# Patient Record
Sex: FEMALE | Race: BLACK OR AFRICAN AMERICAN | URBAN - METROPOLITAN AREA
[De-identification: names, ages, dates, MRNs, and addresses within clinical notes are randomized per-mention and may not be internally consistent; named-entity substitution may affect disease eponyms.]

---

## 2022-12-16 ENCOUNTER — LAB OUTSIDE AN ENCOUNTER (OUTPATIENT)
Dept: URBAN - METROPOLITAN AREA CLINIC 113 | Facility: CLINIC | Age: 32
End: 2022-12-16

## 2022-12-16 ENCOUNTER — WEB ENCOUNTER (OUTPATIENT)
Dept: URBAN - METROPOLITAN AREA CLINIC 113 | Facility: CLINIC | Age: 32
End: 2022-12-16

## 2022-12-16 ENCOUNTER — OFFICE VISIT (OUTPATIENT)
Dept: URBAN - METROPOLITAN AREA CLINIC 113 | Facility: CLINIC | Age: 32
End: 2022-12-16
Payer: COMMERCIAL

## 2022-12-16 VITALS
HEIGHT: 65 IN | RESPIRATION RATE: 20 BRPM | DIASTOLIC BLOOD PRESSURE: 83 MMHG | SYSTOLIC BLOOD PRESSURE: 150 MMHG | BODY MASS INDEX: 29.02 KG/M2 | HEART RATE: 100 BPM | TEMPERATURE: 97.7 F | WEIGHT: 174.2 LBS

## 2022-12-16 DIAGNOSIS — F41.8 ANXIETY ABOUT HEALTH: ICD-10-CM

## 2022-12-16 DIAGNOSIS — R10.31 RLQ ABDOMINAL PAIN: ICD-10-CM

## 2022-12-16 DIAGNOSIS — R93.89 ABNORMAL COMPUTED TOMOGRAPHY ANGIOGRAPHY (CTA): ICD-10-CM

## 2022-12-16 DIAGNOSIS — K21.9 GASTROESOPHAGEAL REFLUX DISEASE, UNSPECIFIED WHETHER ESOPHAGITIS PRESENT: ICD-10-CM

## 2022-12-16 DIAGNOSIS — R63.0 LOSS OF APPETITE: ICD-10-CM

## 2022-12-16 PROCEDURE — 99204 OFFICE O/P NEW MOD 45 MIN: CPT

## 2022-12-16 RX ORDER — PROPRANOLOL HYDROCHLORIDE 10 MG/1
1 TABLET TABLET ORAL ONCE A DAY
Status: ACTIVE | COMMUNITY

## 2022-12-16 RX ORDER — NIFEDIPINE 60 MG/1
1 TABLET ON AN EMPTY STOMACH TABLET, EXTENDED RELEASE ORAL ONCE A DAY
Status: ACTIVE | COMMUNITY

## 2022-12-16 RX ORDER — ERGOCALCIFEROL CAPSULES, 1.25 MG/1
1 CAPSULE CAPSULE ORAL
Status: ACTIVE | COMMUNITY

## 2022-12-16 RX ORDER — FAMOTIDINE 20 MG/1
1 TABLET AT BEDTIME AS NEEDED TABLET, FILM COATED ORAL ONCE A DAY
Status: ACTIVE | COMMUNITY

## 2022-12-16 RX ORDER — SALICYLIC ACID 3 G/100G
1 TABLET SWALLOW WHOLE WITH WATER; DO NOT TAKE WITH FRUIT JUICES LOTION TOPICAL ONCE A DAY
Status: ACTIVE | COMMUNITY

## 2022-12-16 RX ORDER — OMEPRAZOLE 40 MG/1
1 CAPSULE 30 MINUTES BEFORE MORNING MEAL CAPSULE, DELAYED RELEASE ORAL ONCE A DAY
Qty: 90 | Refills: 1 | OUTPATIENT

## 2022-12-16 RX ORDER — DICYCLOMINE HYDROCHLORIDE 10 MG/1
1 TABLET CAPSULE ORAL
Qty: 60 | Refills: 1 | OUTPATIENT

## 2022-12-16 NOTE — HPI-TODAY'S VISIT:
Ms. Gamble is a 32-year-old female with a history of anxiety presenting for follow-up after hospitalization for abdominal pain.  11/30/2022 seen at [facility] ER for abdominal pain.  She complained of abdominal pain, nausea, vomiting, diarrhea for a few days.  CT angiogram revealed no evidence for PE, circumferential thickening and submucosal edema of the pylorus.  Findings may be on the basis of large gastric ulcer, malignancy, or pyloric hypertrophy.  12/7/2022 seen at [facility] ER for abdominal pain.  She complained of right lower quadrant pain, diarrhea, vomiting.  CT abdomen pelvis with contrast revealed free fluid adjacent to the appendix which is normal in caliber although minimally increased from prior exam.  Tiny appendicolith within the appendix.  Findings suggestive of early appendicitis.  Small amount of free fluid in the pelvis.  She was admitted and treated.  Today she presents  with her mother. Since last hospital visit, she was released to surgery for appendectomy from ER, but surgery evaluated her and decided no appendectomy was needed. She has been seen  for high bp and palpitations. She reports all her symptoms began after Thanksgiving. She had diarrhea with mucous that lasted 1 week. She has not had an appetite since. She is very concerned, tearful, and anxious about lack of appetite, lack of energy, and not eating. She reports intermittent RLQ pain. She has not noticed if it is related to eating or bowel movements. She also reports upper abdominal pain that seems no better with eating. She reports daily formed stool without blood or straining. She reports feeling gassy and has acid reflux, for which she uses famotidine 20 mg BID. She uses goody's powder daily for headaches and migraines.

## 2022-12-19 ENCOUNTER — TELEPHONE ENCOUNTER (OUTPATIENT)
Dept: URBAN - METROPOLITAN AREA CLINIC 113 | Facility: CLINIC | Age: 32
End: 2022-12-19

## 2022-12-19 ENCOUNTER — CLAIMS CREATED FROM THE CLAIM WINDOW (OUTPATIENT)
Dept: URBAN - METROPOLITAN AREA CLINIC 4 | Facility: CLINIC | Age: 32
End: 2022-12-19
Payer: COMMERCIAL

## 2022-12-19 ENCOUNTER — CLAIMS CREATED FROM THE CLAIM WINDOW (OUTPATIENT)
Dept: URBAN - METROPOLITAN AREA SURGERY CENTER 25 | Facility: SURGERY CENTER | Age: 32
End: 2022-12-19
Payer: COMMERCIAL

## 2022-12-19 ENCOUNTER — OFFICE VISIT (OUTPATIENT)
Dept: URBAN - METROPOLITAN AREA SURGERY CENTER 25 | Facility: SURGERY CENTER | Age: 32
End: 2022-12-19

## 2022-12-19 DIAGNOSIS — R93.3 ABN FINDINGS-GI TRACT: ICD-10-CM

## 2022-12-19 DIAGNOSIS — K31.89 REACTIVE GASTROPATHY: ICD-10-CM

## 2022-12-19 DIAGNOSIS — K31.89 OTHER DISEASES OF STOMACH AND DUODENUM: ICD-10-CM

## 2022-12-19 DIAGNOSIS — R63.4 ABNORMAL WEIGHT LOSS: ICD-10-CM

## 2022-12-19 PROCEDURE — 43239 EGD BIOPSY SINGLE/MULTIPLE: CPT | Performed by: INTERNAL MEDICINE

## 2022-12-19 PROCEDURE — 88305 TISSUE EXAM BY PATHOLOGIST: CPT | Performed by: PATHOLOGY

## 2022-12-19 PROCEDURE — G8907 PT DOC NO EVENTS ON DISCHARG: HCPCS | Performed by: INTERNAL MEDICINE

## 2022-12-19 RX ORDER — DICYCLOMINE HYDROCHLORIDE 10 MG/1
1 TABLET CAPSULE ORAL
Qty: 60 | Refills: 1 | Status: ACTIVE | COMMUNITY

## 2022-12-19 RX ORDER — ERGOCALCIFEROL CAPSULES, 1.25 MG/1
1 CAPSULE CAPSULE ORAL
Status: ACTIVE | COMMUNITY

## 2022-12-19 RX ORDER — OMEPRAZOLE 40 MG/1
1 CAPSULE 30 MINUTES BEFORE MORNING MEAL CAPSULE, DELAYED RELEASE ORAL ONCE A DAY
Qty: 90 | Refills: 1 | Status: ACTIVE | COMMUNITY

## 2022-12-19 RX ORDER — PROPRANOLOL HYDROCHLORIDE 10 MG/1
1 TABLET TABLET ORAL ONCE A DAY
Status: ACTIVE | COMMUNITY

## 2022-12-19 RX ORDER — NIFEDIPINE 60 MG/1
1 TABLET ON AN EMPTY STOMACH TABLET, EXTENDED RELEASE ORAL ONCE A DAY
Status: ACTIVE | COMMUNITY

## 2022-12-19 RX ORDER — SALICYLIC ACID 3 G/100G
1 TABLET SWALLOW WHOLE WITH WATER; DO NOT TAKE WITH FRUIT JUICES LOTION TOPICAL ONCE A DAY
Status: ACTIVE | COMMUNITY

## 2022-12-19 RX ORDER — FAMOTIDINE 20 MG/1
1 TABLET AT BEDTIME AS NEEDED TABLET, FILM COATED ORAL ONCE A DAY
Status: ACTIVE | COMMUNITY

## 2022-12-20 LAB
A/G RATIO: 1.6
ABSOLUTE BASOPHILS: 29
ABSOLUTE EOSINOPHILS: 120
ABSOLUTE LYMPHOCYTES: 2383
ABSOLUTE MONOCYTES: 302
ABSOLUTE NEUTROPHILS: 2867
ALBUMIN: 4.1
ALKALINE PHOSPHATASE: 54
ALT (SGPT): 11
AST (SGOT): 10
BASOPHILS: 0.5
BILIRUBIN, TOTAL: 0.4
BUN/CREATININE RATIO: 8
BUN: 6
C-REACTIVE PROTEIN, QUANT: 0.2
CALCIUM: 9.3
CARBON DIOXIDE, TOTAL: 27
CHLORIDE: 105
CREATININE: 0.77
EGFR: 104
EOSINOPHILS: 2.1
GLOBULIN, TOTAL: 2.5
GLUCOSE: 138
HEMATOCRIT: 35.8
HEMOGLOBIN: 12.4
LYMPHOCYTES: 41.8
MCH: 33
MCHC: 34.6
MCV: 95.2
MONOCYTES: 5.3
MPV: 10.5
NEUTROPHILS: 50.3
PLATELET COUNT: 265
POTASSIUM: 3.7
PROTEIN, TOTAL: 6.6
RDW: 11.6
RED BLOOD CELL COUNT: 3.76
SODIUM: 137
TSH: 0.51
WHITE BLOOD CELL COUNT: 5.7

## 2022-12-23 ENCOUNTER — TELEPHONE ENCOUNTER (OUTPATIENT)
Dept: URBAN - METROPOLITAN AREA CLINIC 113 | Facility: CLINIC | Age: 32
End: 2022-12-23

## 2022-12-27 LAB
CALPROTECTIN, FECAL: 135
FECAL FAT, QUALITATIVE: (no result)
OVA AND PARASITES, CONC AND PERM SMEAR: (no result)

## 2023-01-09 ENCOUNTER — OFFICE VISIT (OUTPATIENT)
Dept: URBAN - METROPOLITAN AREA CLINIC 113 | Facility: CLINIC | Age: 33
End: 2023-01-09

## 2023-01-11 ENCOUNTER — TELEPHONE ENCOUNTER (OUTPATIENT)
Dept: URBAN - METROPOLITAN AREA CLINIC 113 | Facility: CLINIC | Age: 33
End: 2023-01-11

## 2023-01-12 ENCOUNTER — TELEPHONE ENCOUNTER (OUTPATIENT)
Dept: URBAN - METROPOLITAN AREA CLINIC 113 | Facility: CLINIC | Age: 33
End: 2023-01-12

## 2023-01-13 ENCOUNTER — OFFICE VISIT (OUTPATIENT)
Dept: URBAN - METROPOLITAN AREA CLINIC 107 | Facility: CLINIC | Age: 33
End: 2023-01-13

## 2023-01-17 ENCOUNTER — TELEPHONE ENCOUNTER (OUTPATIENT)
Dept: URBAN - METROPOLITAN AREA CLINIC 113 | Facility: CLINIC | Age: 33
End: 2023-01-17

## 2023-01-17 ENCOUNTER — OFFICE VISIT (OUTPATIENT)
Dept: URBAN - METROPOLITAN AREA CLINIC 113 | Facility: CLINIC | Age: 33
End: 2023-01-17
Payer: COMMERCIAL

## 2023-01-17 ENCOUNTER — OFFICE VISIT (OUTPATIENT)
Dept: URBAN - METROPOLITAN AREA CLINIC 113 | Facility: CLINIC | Age: 33
End: 2023-01-17

## 2023-01-17 VITALS
TEMPERATURE: 98.3 F | WEIGHT: 168.8 LBS | DIASTOLIC BLOOD PRESSURE: 87 MMHG | HEART RATE: 105 BPM | BODY MASS INDEX: 28.12 KG/M2 | HEIGHT: 65 IN | RESPIRATION RATE: 20 BRPM | SYSTOLIC BLOOD PRESSURE: 148 MMHG

## 2023-01-17 DIAGNOSIS — R93.89 ABNORMAL COMPUTED TOMOGRAPHY ANGIOGRAPHY (CTA): ICD-10-CM

## 2023-01-17 DIAGNOSIS — G47.00 INSOMNIA, UNSPECIFIED TYPE: ICD-10-CM

## 2023-01-17 DIAGNOSIS — K37 APPENDICITIS, UNSPECIFIED APPENDICITIS TYPE: ICD-10-CM

## 2023-01-17 DIAGNOSIS — R63.4 WEIGHT LOSS: ICD-10-CM

## 2023-01-17 DIAGNOSIS — R19.5 ELEVATED FECAL CALPROTECTIN: ICD-10-CM

## 2023-01-17 DIAGNOSIS — R63.0 LOSS OF APPETITE: ICD-10-CM

## 2023-01-17 DIAGNOSIS — K86.89 PANCREATIC INSUFFICIENCY: ICD-10-CM

## 2023-01-17 DIAGNOSIS — R10.31 RLQ ABDOMINAL PAIN: ICD-10-CM

## 2023-01-17 DIAGNOSIS — F41.8 ANXIETY ABOUT HEALTH: ICD-10-CM

## 2023-01-17 DIAGNOSIS — R19.7 DIARRHEA OF PRESUMED INFECTIOUS ORIGIN: ICD-10-CM

## 2023-01-17 DIAGNOSIS — K25.3 ACUTE GASTRIC ULCER WITHOUT HEMORRHAGE OR PERFORATION: ICD-10-CM

## 2023-01-17 PROBLEM — 193462001: Status: ACTIVE | Noted: 2023-01-17

## 2023-01-17 PROCEDURE — 99214 OFFICE O/P EST MOD 30 MIN: CPT

## 2023-01-17 RX ORDER — PROPRANOLOL HYDROCHLORIDE 10 MG/1
1 TABLET TABLET ORAL ONCE A DAY
Status: ACTIVE | COMMUNITY

## 2023-01-17 RX ORDER — NIFEDIPINE 60 MG/1
1 TABLET ON AN EMPTY STOMACH TABLET, EXTENDED RELEASE ORAL ONCE A DAY
Status: ACTIVE | COMMUNITY

## 2023-01-17 RX ORDER — PANCRELIPASE 36000; 180000; 114000 [USP'U]/1; [USP'U]/1; [USP'U]/1
AS DIRECTED CAPSULE, DELAYED RELEASE PELLETS ORAL
Qty: 250 | Refills: 6 | OUTPATIENT
Start: 2023-01-17 | End: 2023-08-15

## 2023-01-17 RX ORDER — DICYCLOMINE HYDROCHLORIDE 10 MG/1
1 TABLET CAPSULE ORAL
Qty: 60 | Refills: 1 | Status: ACTIVE | COMMUNITY

## 2023-01-17 RX ORDER — OMEPRAZOLE 40 MG/1
1 CAPSULE 30 MINUTES BEFORE MORNING MEAL CAPSULE, DELAYED RELEASE ORAL ONCE A DAY
Qty: 90 | Refills: 1 | Status: ACTIVE | COMMUNITY

## 2023-01-17 RX ORDER — PANCRELIPASE 36000; 180000; 114000 [USP'U]/1; [USP'U]/1; [USP'U]/1
AS DIRECTED CAPSULE, DELAYED RELEASE PELLETS ORAL
Qty: 250 | Refills: 6 | Status: ACTIVE | COMMUNITY
Start: 2023-01-17 | End: 2023-08-15

## 2023-01-17 RX ORDER — PANTOPRAZOLE SODIUM 40 MG/1
1 TABLET TABLET, DELAYED RELEASE ORAL ONCE A DAY
Qty: 60 TABLET | Refills: 0 | OUTPATIENT
Start: 2023-01-17

## 2023-01-17 RX ORDER — ERGOCALCIFEROL CAPSULES, 1.25 MG/1
1 CAPSULE CAPSULE ORAL
Status: ACTIVE | COMMUNITY

## 2023-01-17 RX ORDER — PANTOPRAZOLE SODIUM 40 MG/1
1 TABLET TABLET, DELAYED RELEASE ORAL ONCE A DAY
Qty: 30 | Refills: 2 | OUTPATIENT
Start: 2023-01-18

## 2023-01-17 RX ORDER — SALICYLIC ACID 3 G/100G
1 TABLET SWALLOW WHOLE WITH WATER; DO NOT TAKE WITH FRUIT JUICES LOTION TOPICAL ONCE A DAY
Status: ACTIVE | COMMUNITY

## 2023-01-17 RX ORDER — SODIUM, POTASSIUM,MAG SULFATES 17.5-3.13G
354 ML SOLUTION, RECONSTITUTED, ORAL ORAL ONCE
Qty: 354 MILLILITER | Refills: 0 | Status: ACTIVE | COMMUNITY
Start: 2023-01-17 | End: 2023-01-18

## 2023-01-17 RX ORDER — SODIUM, POTASSIUM,MAG SULFATES 17.5-3.13G
354 ML SOLUTION, RECONSTITUTED, ORAL ORAL ONCE
Qty: 354 MILLILITER | Refills: 0 | OUTPATIENT
Start: 2023-01-17 | End: 2023-01-18

## 2023-01-17 RX ORDER — FAMOTIDINE 20 MG/1
1 TABLET AT BEDTIME AS NEEDED TABLET, FILM COATED ORAL ONCE A DAY
Status: ACTIVE | COMMUNITY

## 2023-01-17 RX ORDER — PANTOPRAZOLE SODIUM 40 MG/1
1 TABLET TABLET, DELAYED RELEASE ORAL ONCE A DAY
Qty: 60 TABLET | Refills: 0 | Status: ACTIVE | COMMUNITY
Start: 2023-01-17

## 2023-01-17 RX ORDER — FERROUS SULFATE 325(65) MG
1 TABLET TABLET ORAL ONCE A DAY
Status: ACTIVE | COMMUNITY

## 2023-01-17 NOTE — HPI-TODAY'S VISIT:
Ms. Gamble is a 32-year-old female with a history of anxiety presenting for follow-up after hospitalization for abdominal pain.  11/30/2022 seen at [facility] ER for abdominal pain.  She complained of abdominal pain, nausea, vomiting, diarrhea for a few days.  CT angiogram revealed no evidence for PE, circumferential thickening and submucosal edema of the pylorus.  Findings may be on the basis of large gastric ulcer, malignancy, or pyloric hypertrophy.  12/7/2022 seen at [facility] ER for abdominal pain.  She complained of right lower quadrant pain, diarrhea, vomiting.  CT abdomen pelvis with contrast revealed free fluid adjacent to the appendix which is normal in caliber although minimally increased from prior exam.  Tiny appendicolith within the appendix.  Findings suggestive of early appendicitis.  Small amount of free fluid in the pelvis.  She was admitted and treated.  Today she presents  with her mother. Since last hospital visit, she was released to surgery for appendectomy from ER, but surgery evaluated her and decided no appendectomy was needed. She has been seen  for high bp and palpitations. She reports all her symptoms began after Thanksgiving. She had diarrhea with mucous that lasted 1 week. She has not had an appetite since. She is very concerned, tearful, and anxious about lack of appetite, lack of energy, and not eating. She reports intermittent RLQ pain. She has not noticed if it is related to eating or bowel movements. She also reports upper abdominal pain that seems no better with eating. She reports daily formed stool without blood or straining. She reports feeling gassy and has acid reflux, for which she uses famotidine 20 mg BID. She uses goody's powder daily for headaches and migraines.  Interval history: 32-year-old female presents for follow-up after EGD.  She was last seen on 12/16/2022.  She presented as an ER follow-up for abdominal pain she reported multiple GI complaints at that time including loss of appetite, intermittent right lower quadrant pain, heartburn, upper abdominal pain.  She did have a CT angiogram which revealed thickening and submucosal edema of the pylorus.  She was planned for EGD for further evaluation and started on omeprazole 40 mg once daily in addition to famotidine 20 mg at night.  She was provided dicyclomine for intermittent right lower quadrant pain as well.  EGD 12/19/2022:Performed without difficulty.  Normal second portion of duodenum and third portion of duodenum status post biopsy.  Pathology was negative for celiac sprue.  Erythematous duodenopathy noted.  A nonbleeding gastric ulcer without stigmata of bleeding noted in the gastric antrum measuring 7 mm.  This was biopsied and revealed chemical/reactive gastropathy, negative for H. pylori or intestinal metaplasia.  Exam was also notable for small hiatal hernia and normal esophagus.  Patient was noted to be taking multiple Goody's powders at that time.  Patient was recommended a multitude of blood work at that time as well.  Labs 12/20/2022:Abnormal fecal fat, negative stool O&P, elevated fecal calprotectin 135, glucose 138 otherwise unremarkable CMP, unremarkable CBC, normal TSH, normal CRP.  She is no longer taking the Omeprazole. She took this for a few weeks. She stopped this on her own accord "as she does not have reflux." Her epigastric pain resolved. She denies any lower abdominal pain as well. Denies nausea or vomiting. She does have alternating bowel habits with occasional loose stools that float in the commode water. She has lost approxiamtely 20 pounds since Thanksgiving. She attributes this to her decreased appetite and oral intake. The patient admits she is quick to become anxious about her health. She is concerned with malignancy.  She does admit to poor sleep and sweating at night. She admits she is not able to shut off her mind. melatonin worsens her sweating. She has tried and failed sleepy teas as well.

## 2023-01-19 ENCOUNTER — ERX REFILL RESPONSE (OUTPATIENT)
Dept: URBAN - METROPOLITAN AREA CLINIC 113 | Facility: CLINIC | Age: 33
End: 2023-01-19

## 2023-01-19 RX ORDER — POLYETHYLENE GLYCOL 3350, SODIUM CHLORIDE, SODIUM BICARBONATE, POTASSIUM CHLORIDE 420; 11.2; 5.72; 1.48 G/4L; G/4L; G/4L; G/4L
AS DIRECTED POWDER, FOR SOLUTION ORAL ONCE
Qty: 420 GM | Refills: 0 | OUTPATIENT

## 2023-01-23 ENCOUNTER — WEB ENCOUNTER (OUTPATIENT)
Dept: URBAN - METROPOLITAN AREA CLINIC 107 | Facility: CLINIC | Age: 33
End: 2023-01-23

## 2023-01-23 ENCOUNTER — OFFICE VISIT (OUTPATIENT)
Dept: URBAN - METROPOLITAN AREA CLINIC 107 | Facility: CLINIC | Age: 33
End: 2023-01-23
Payer: COMMERCIAL

## 2023-01-23 VITALS
SYSTOLIC BLOOD PRESSURE: 138 MMHG | HEART RATE: 89 BPM | WEIGHT: 173 LBS | TEMPERATURE: 97.7 F | HEIGHT: 65 IN | DIASTOLIC BLOOD PRESSURE: 75 MMHG | BODY MASS INDEX: 28.82 KG/M2

## 2023-01-23 DIAGNOSIS — R10.31 RLQ ABDOMINAL PAIN: ICD-10-CM

## 2023-01-23 DIAGNOSIS — K86.89 PANCREATIC INSUFFICIENCY: ICD-10-CM

## 2023-01-23 DIAGNOSIS — R19.5 ELEVATED FECAL CALPROTECTIN: ICD-10-CM

## 2023-01-23 DIAGNOSIS — R93.89 ABNORMAL COMPUTED TOMOGRAPHY ANGIOGRAPHY (CTA): ICD-10-CM

## 2023-01-23 DIAGNOSIS — R63.0 LOSS OF APPETITE: ICD-10-CM

## 2023-01-23 DIAGNOSIS — R19.7 DIARRHEA OF PRESUMED INFECTIOUS ORIGIN: ICD-10-CM

## 2023-01-23 DIAGNOSIS — K21.9 GASTROESOPHAGEAL REFLUX DISEASE, UNSPECIFIED WHETHER ESOPHAGITIS PRESENT: ICD-10-CM

## 2023-01-23 DIAGNOSIS — F41.8 ANXIETY ABOUT HEALTH: ICD-10-CM

## 2023-01-23 DIAGNOSIS — R63.4 WEIGHT LOSS: ICD-10-CM

## 2023-01-23 DIAGNOSIS — K25.3 ACUTE GASTRIC ULCER WITHOUT HEMORRHAGE OR PERFORATION: ICD-10-CM

## 2023-01-23 PROBLEM — 79890006: Status: ACTIVE | Noted: 2022-12-18

## 2023-01-23 PROBLEM — 247808006: Status: ACTIVE | Noted: 2022-12-16

## 2023-01-23 PROBLEM — 235595009: Status: ACTIVE | Noted: 2022-12-18

## 2023-01-23 PROBLEM — 129679001: Status: ACTIVE | Noted: 2022-12-18

## 2023-01-23 PROCEDURE — 99214 OFFICE O/P EST MOD 30 MIN: CPT | Performed by: INTERNAL MEDICINE

## 2023-01-23 RX ORDER — PANTOPRAZOLE SODIUM 40 MG/1
1 TABLET TABLET, DELAYED RELEASE ORAL ONCE A DAY
Qty: 60 TABLET | Refills: 0 | Status: ACTIVE | COMMUNITY
Start: 2023-01-17

## 2023-01-23 RX ORDER — PANCRELIPASE 36000; 180000; 114000 [USP'U]/1; [USP'U]/1; [USP'U]/1
AS DIRECTED CAPSULE, DELAYED RELEASE PELLETS ORAL
Qty: 250 | Refills: 6 | Status: ACTIVE | COMMUNITY
Start: 2023-01-17 | End: 2023-08-15

## 2023-01-23 RX ORDER — PANTOPRAZOLE SODIUM 40 MG/1
1 TABLET TABLET, DELAYED RELEASE ORAL ONCE A DAY
Qty: 30 | Refills: 2 | Status: ACTIVE | COMMUNITY
Start: 2023-01-18

## 2023-01-23 RX ORDER — SALICYLIC ACID 3 G/100G
1 TABLET SWALLOW WHOLE WITH WATER; DO NOT TAKE WITH FRUIT JUICES LOTION TOPICAL ONCE A DAY
Status: ACTIVE | COMMUNITY

## 2023-01-23 RX ORDER — FAMOTIDINE 20 MG/1
1 TABLET AT BEDTIME AS NEEDED TABLET, FILM COATED ORAL ONCE A DAY
Status: ACTIVE | COMMUNITY

## 2023-01-23 RX ORDER — ERGOCALCIFEROL CAPSULES, 1.25 MG/1
1 CAPSULE CAPSULE ORAL
Status: ACTIVE | COMMUNITY

## 2023-01-23 RX ORDER — OMEPRAZOLE 40 MG/1
1 CAPSULE 30 MINUTES BEFORE MORNING MEAL CAPSULE, DELAYED RELEASE ORAL ONCE A DAY
Qty: 90 | Refills: 1 | Status: ACTIVE | COMMUNITY

## 2023-01-23 RX ORDER — NIFEDIPINE 60 MG/1
1 TABLET ON AN EMPTY STOMACH TABLET, EXTENDED RELEASE ORAL ONCE A DAY
Status: ACTIVE | COMMUNITY

## 2023-01-23 RX ORDER — DICYCLOMINE HYDROCHLORIDE 10 MG/1
1 TABLET CAPSULE ORAL
Qty: 60 | Refills: 1 | Status: ACTIVE | COMMUNITY

## 2023-01-23 RX ORDER — FERROUS SULFATE 325(65) MG
1 TABLET TABLET ORAL ONCE A DAY
Status: ACTIVE | COMMUNITY

## 2023-01-23 NOTE — HPI-TODAY'S VISIT:
Ms. Gamble is a 32-year-old female with a history of anxiety presenting for follow-up after hospitalization for abdominal pain.  11/30/2022 seen at [facility] ER for abdominal pain.  She complained of abdominal pain, nausea, vomiting, diarrhea for a few days.  CT angiogram revealed no evidence for PE, circumferential thickening and submucosal edema of the pylorus.  Findings may be on the basis of large gastric ulcer, malignancy, or pyloric hypertrophy.  12/7/2022 seen at [facility] ER for abdominal pain.  She complained of right lower quadrant pain, diarrhea, vomiting.  CT abdomen pelvis with contrast revealed free fluid adjacent to the appendix which is normal in caliber although minimally increased from prior exam.  Tiny appendicolith within the appendix.  Findings suggestive of early appendicitis.  Small amount of free fluid in the pelvis.  She was admitted and treated.  Today she presents  with her mother. Since last hospital visit, she was released to surgery for appendectomy from ER, but surgery evaluated her and decided no appendectomy was needed. She has been seen  for high bp and palpitations. She reports all her symptoms began after Thanksgiving. She had diarrhea with mucous that lasted 1 week. She has not had an appetite since. She is very concerned, tearful, and anxious about lack of appetite, lack of energy, and not eating. She reports intermittent RLQ pain. She has not noticed if it is related to eating or bowel movements. She also reports upper abdominal pain that seems no better with eating. She reports daily formed stool without blood or straining. She reports feeling gassy and has acid reflux, for which she uses famotidine 20 mg BID. She uses goody's powder daily for headaches and migraines.  Interval history: 32-year-old female presents for follow-up after EGD.  She was last seen on 12/16/2022.  She presented as an ER follow-up for abdominal pain she reported multiple GI complaints at that time including loss of appetite, intermittent right lower quadrant pain, heartburn, upper abdominal pain.  She did have a CT angiogram which revealed thickening and submucosal edema of the pylorus.  She was planned for EGD for further evaluation and started on omeprazole 40 mg once daily in addition to famotidine 20 mg at night.  She was provided dicyclomine for intermittent right lower quadrant pain as well.  EGD 12/19/2022:Performed without difficulty.  Normal second portion of duodenum and third portion of duodenum status post biopsy.  Pathology was negative for celiac sprue.  Erythematous duodenopathy noted.  A nonbleeding gastric ulcer without stigmata of bleeding noted in the gastric antrum measuring 7 mm.  This was biopsied and revealed chemical/reactive gastropathy, negative for H. pylori or intestinal metaplasia.  Exam was also notable for small hiatal hernia and normal esophagus.  Patient was noted to be taking multiple Goody's powders at that time.  Patient was recommended a multitude of blood work at that time as well.  Labs 12/20/2022:Abnormal fecal fat, negative stool O&P, elevated fecal calprotectin 135, glucose 138 otherwise unremarkable CMP, unremarkable CBC, normal TSH, normal CRP.  She is no longer taking the Omeprazole. She took this for a few weeks. She stopped this on her own accord "as she does not have reflux." Her epigastric pain resolved. She denies any lower abdominal pain as well. Denies nausea or vomiting. She does have alternating bowel habits with occasional loose stools that float in the commode water. She has lost approxiamtely 20 pounds since Thanksgiving. She attributes this to her decreased appetite and oral intake. The patient admits she is quick to become anxious about her health. She is concerned with malignancy.  She does admit to poor sleep and sweating at night. She admits she is not able to shut off her mind. melatonin worsens her sweating. She has tried and failed sleepy teas as well. Interval history, 1/23/2023.  Patient states she only has diarrhea now when her family calls early in the morning.  If they do not bother her early in the morning she does not have diarrhea.  She is no longer on the Creon.  She is worried that diarrhea may return.  She has numerous questions in regards to nonsteroidal anti-inflammatory agents.  I discussed with her that Tylenol is okay for the stomach.  She does want to take omeprazole 20 mg for mild heartburn.  She is scheduled for colonoscopy later this week.

## 2023-01-27 ENCOUNTER — OFFICE VISIT (OUTPATIENT)
Dept: URBAN - METROPOLITAN AREA SURGERY CENTER 25 | Facility: SURGERY CENTER | Age: 33
End: 2023-01-27

## 2023-03-24 ENCOUNTER — OFFICE VISIT (OUTPATIENT)
Dept: URBAN - METROPOLITAN AREA SURGERY CENTER 25 | Facility: SURGERY CENTER | Age: 33
End: 2023-03-24

## 2023-03-28 ENCOUNTER — OFFICE VISIT (OUTPATIENT)
Dept: URBAN - METROPOLITAN AREA CLINIC 113 | Facility: CLINIC | Age: 33
End: 2023-03-28
Payer: COMMERCIAL

## 2023-03-28 ENCOUNTER — DASHBOARD ENCOUNTERS (OUTPATIENT)
Age: 33
End: 2023-03-28

## 2023-03-28 VITALS
RESPIRATION RATE: 20 BRPM | TEMPERATURE: 98 F | HEIGHT: 65 IN | WEIGHT: 170 LBS | BODY MASS INDEX: 28.32 KG/M2 | SYSTOLIC BLOOD PRESSURE: 119 MMHG | HEART RATE: 93 BPM | DIASTOLIC BLOOD PRESSURE: 68 MMHG

## 2023-03-28 DIAGNOSIS — K25.3 ACUTE GASTRIC ULCER WITHOUT HEMORRHAGE OR PERFORATION: ICD-10-CM

## 2023-03-28 DIAGNOSIS — R19.5 ELEVATED FECAL CALPROTECTIN: ICD-10-CM

## 2023-03-28 DIAGNOSIS — R10.31 RLQ ABDOMINAL PAIN: ICD-10-CM

## 2023-03-28 DIAGNOSIS — K37 APPENDICITIS, UNSPECIFIED APPENDICITIS TYPE: ICD-10-CM

## 2023-03-28 DIAGNOSIS — R13.19 ESOPHAGEAL DYSPHAGIA: ICD-10-CM

## 2023-03-28 DIAGNOSIS — F41.8 ANXIETY ABOUT HEALTH: ICD-10-CM

## 2023-03-28 DIAGNOSIS — K21.9 GASTROESOPHAGEAL REFLUX DISEASE, UNSPECIFIED WHETHER ESOPHAGITIS PRESENT: ICD-10-CM

## 2023-03-28 PROCEDURE — 99214 OFFICE O/P EST MOD 30 MIN: CPT | Performed by: INTERNAL MEDICINE

## 2023-03-28 RX ORDER — SALICYLIC ACID 3 G/100G
1 TABLET SWALLOW WHOLE WITH WATER; DO NOT TAKE WITH FRUIT JUICES LOTION TOPICAL ONCE A DAY
Status: ON HOLD | COMMUNITY

## 2023-03-28 RX ORDER — ERGOCALCIFEROL CAPSULES, 1.25 MG/1
1 CAPSULE CAPSULE ORAL
Status: ON HOLD | COMMUNITY

## 2023-03-28 RX ORDER — FERROUS SULFATE 325(65) MG
1 TABLET TABLET ORAL ONCE A DAY
Status: ON HOLD | COMMUNITY

## 2023-03-28 RX ORDER — OMEPRAZOLE 40 MG/1
1 CAPSULE 30 MINUTES BEFORE MORNING MEAL CAPSULE, DELAYED RELEASE ORAL ONCE A DAY
Qty: 90 | Refills: 1 | Status: ON HOLD | COMMUNITY

## 2023-03-28 RX ORDER — PANTOPRAZOLE SODIUM 40 MG/1
1 TABLET TABLET, DELAYED RELEASE ORAL ONCE A DAY
Qty: 30 | Refills: 2 | Status: ON HOLD | COMMUNITY
Start: 2023-01-18

## 2023-03-28 RX ORDER — FAMOTIDINE 20 MG/1
1 TABLET AT BEDTIME AS NEEDED TABLET, FILM COATED ORAL ONCE A DAY
Status: ON HOLD | COMMUNITY

## 2023-03-28 RX ORDER — PANCRELIPASE 36000; 180000; 114000 [USP'U]/1; [USP'U]/1; [USP'U]/1
AS DIRECTED CAPSULE, DELAYED RELEASE PELLETS ORAL
Qty: 250 | Refills: 6 | Status: ON HOLD | COMMUNITY
Start: 2023-01-17 | End: 2023-08-15

## 2023-03-28 RX ORDER — PANTOPRAZOLE SODIUM 40 MG/1
1 TABLET TABLET, DELAYED RELEASE ORAL ONCE A DAY
Qty: 60 TABLET | Refills: 0 | Status: ON HOLD | COMMUNITY
Start: 2023-01-17

## 2023-03-28 RX ORDER — OMEPRAZOLE 40 MG/1
1 CAPSULE 30 MINUTES BEFORE MORNING MEAL CAPSULE, DELAYED RELEASE ORAL ONCE A DAY
Qty: 90 | Refills: 3 | OUTPATIENT
Start: 2023-03-28

## 2023-03-28 RX ORDER — NIFEDIPINE 60 MG/1
1 TABLET ON AN EMPTY STOMACH TABLET, EXTENDED RELEASE ORAL ONCE A DAY
Status: ON HOLD | COMMUNITY

## 2023-03-28 RX ORDER — DICYCLOMINE HYDROCHLORIDE 10 MG/1
1 TABLET CAPSULE ORAL
Qty: 60 | Refills: 1 | Status: ON HOLD | COMMUNITY

## 2023-03-28 NOTE — HPI-TODAY'S VISIT:
Ms. Gamble is a 32-year-old female with a history of anxiety presenting for follow-up after hospitalization for abdominal pain.  11/30/2022 seen at [facility] ER for abdominal pain.  She complained of abdominal pain, nausea, vomiting, diarrhea for a few days.  CT angiogram revealed no evidence for PE, circumferential thickening and submucosal edema of the pylorus.  Findings may be on the basis of large gastric ulcer, malignancy, or pyloric hypertrophy.  12/7/2022 seen at [facility] ER for abdominal pain.  She complained of right lower quadrant pain, diarrhea, vomiting.  CT abdomen pelvis with contrast revealed free fluid adjacent to the appendix which is normal in caliber although minimally increased from prior exam.  Tiny appendicolith within the appendix.  Findings suggestive of early appendicitis.  Small amount of free fluid in the pelvis.  She was admitted and treated.  Today she presents  with her mother. Since last hospital visit, she was released to surgery for appendectomy from ER, but surgery evaluated her and decided no appendectomy was needed. She has been seen  for high bp and palpitations. She reports all her symptoms began after Thanksgiving. She had diarrhea with mucous that lasted 1 week. She has not had an appetite since. She is very concerned, tearful, and anxious about lack of appetite, lack of energy, and not eating. She reports intermittent RLQ pain. She has not noticed if it is related to eating or bowel movements. She also reports upper abdominal pain that seems no better with eating. She reports daily formed stool without blood or straining. She reports feeling gassy and has acid reflux, for which she uses famotidine 20 mg BID. She uses goody's powder daily for headaches and migraines.  Interval history: 32-year-old female presents for follow-up after EGD.  She was last seen on 12/16/2022.  She presented as an ER follow-up for abdominal pain she reported multiple GI complaints at that time including loss of appetite, intermittent right lower quadrant pain, heartburn, upper abdominal pain.  She did have a CT angiogram which revealed thickening and submucosal edema of the pylorus.  She was planned for EGD for further evaluation and started on omeprazole 40 mg once daily in addition to famotidine 20 mg at night.  She was provided dicyclomine for intermittent right lower quadrant pain as well.  EGD 12/19/2022:Performed without difficulty.  Normal second portion of duodenum and third portion of duodenum status post biopsy.  Pathology was negative for celiac sprue.  Erythematous duodenopathy noted.  A nonbleeding gastric ulcer without stigmata of bleeding noted in the gastric antrum measuring 7 mm.  This was biopsied and revealed chemical/reactive gastropathy, negative for H. pylori or intestinal metaplasia.  Exam was also notable for small hiatal hernia and normal esophagus.  Patient was noted to be taking multiple Goody's powders at that time.  Patient was recommended a multitude of blood work at that time as well.  Labs 12/20/2022:Abnormal fecal fat, negative stool O&P, elevated fecal calprotectin 135, glucose 138 otherwise unremarkable CMP, unremarkable CBC, normal TSH, normal CRP.  She is no longer taking the Omeprazole. She took this for a few weeks. She stopped this on her own accord "as she does not have reflux." Her epigastric pain resolved. She denies any lower abdominal pain as well. Denies nausea or vomiting. She does have alternating bowel habits with occasional loose stools that float in the commode water. She has lost approxiamtely 20 pounds since Thanksgiving. She attributes this to her decreased appetite and oral intake. The patient admits she is quick to become anxious about her health. She is concerned with malignancy.  She does admit to poor sleep and sweating at night. She admits she is not able to shut off her mind. melatonin worsens her sweating. She has tried and failed sleepy teas as well. Interval history, 1/23/2023.  Patient states she only has diarrhea now when her family calls early in the morning.  If they do not bother her early in the morning she does not have diarrhea.  She is no longer on the Creon.  She is worried that diarrhea may return.  She has numerous questions in regards to nonsteroidal anti-inflammatory agents.  I discussed with her that Tylenol is okay for the stomach.  She does want to take omeprazole 20 mg for mild heartburn.  She is scheduled for colonoscopy later this week. Interval history, 3/28/2023. The patient complains over the past couple weeks of solid food dysphagia to steak.  She does not have any significant heartburn.  She takes Prilosec as needed.  She also had an episode for a day of diarrhea but this has resolved.  She did not come in for the colonoscopy initially due to the fact that she was on her cycle.  She is rescheduled for next month.

## 2023-05-16 ENCOUNTER — OFFICE VISIT (OUTPATIENT)
Dept: URBAN - METROPOLITAN AREA SURGERY CENTER 25 | Facility: SURGERY CENTER | Age: 33
End: 2023-05-16